# Patient Record
Sex: MALE | Race: WHITE | URBAN - METROPOLITAN AREA
[De-identification: names, ages, dates, MRNs, and addresses within clinical notes are randomized per-mention and may not be internally consistent; named-entity substitution may affect disease eponyms.]

---

## 2017-03-06 PROBLEM — N20.0 KIDNEY STONE ON LEFT SIDE: Status: ACTIVE | Noted: 2017-03-06

## 2019-04-26 ENCOUNTER — HOSPITAL ENCOUNTER (OUTPATIENT)
Age: 39
Setting detail: SPECIMEN
Discharge: HOME | End: 2019-04-26
Payer: COMMERCIAL

## 2019-04-30 LAB
STONE COMPOSITION: NORMAL
STONE DESCRIPTION: NORMAL
STONE MASS: 11 MG
STONE NUMBER: 1
STONE SIZE: NORMAL MM

## 2025-07-21 LAB
BUN / CREAT RATIO: NORMAL
BUN BLDV-MCNC: 20 MG/DL
CREAT SERPL-MCNC: 1.1 MG/DL

## 2025-07-22 ENCOUNTER — TELEPHONE (OUTPATIENT)
Age: 45
End: 2025-07-22

## 2025-07-22 NOTE — TELEPHONE ENCOUNTER
Patient called and had been to the hospital for kidney stones. Pain is now a 4-5 with pain relievers, and 8-9 without. The pain is on the left side by his lower abdomen. No nausea, vomiting, fever, or chills. No blood in the urine.

## 2025-07-25 ENCOUNTER — TELEPHONE (OUTPATIENT)
Age: 45
End: 2025-07-25

## 2025-07-25 RX ORDER — TAMSULOSIN HYDROCHLORIDE 0.4 MG/1
0.4 CAPSULE ORAL DAILY
Qty: 30 CAPSULE | Refills: 0 | Status: SHIPPED | OUTPATIENT
Start: 2025-07-25

## 2025-07-25 NOTE — TELEPHONE ENCOUNTER
MEDICATION: tamsulosin (FLOMAX) 0.4 mg capsule.    PATIENT WENT TO THE ER FOR KIDNEY STONES AND SAYS THE FLOMAX IS HELPING PASS THEM AND WANTS TO KNOW IF HE CAN RECEIVE SOME ADDITIONAL PILLS SINCE HE IS DOWN TO 2 PILLS NOW AND IS FEELING SLIGHTLY BETTER.    PLEASE ADVISE.

## 2025-07-25 NOTE — TELEPHONE ENCOUNTER
See Rx for Tamsulosin 0.4 mg po qd to facilitate passage of the ureteral calculus.  Please see how much pain (0 to 10/10) currently, any fever/chills/nausea or vomiting to determine timing of follow up.

## 2025-08-25 RX ORDER — TAMSULOSIN HYDROCHLORIDE 0.4 MG/1
0.4 CAPSULE ORAL DAILY
Qty: 30 CAPSULE | Refills: 0 | Status: SHIPPED | OUTPATIENT
Start: 2025-08-25

## 2025-09-02 ENCOUNTER — HOSPITAL ENCOUNTER (OUTPATIENT)
Dept: GENERAL RADIOLOGY | Age: 45
Discharge: HOME | End: 2025-09-04
Payer: COMMERCIAL

## 2025-09-02 ENCOUNTER — OFFICE VISIT (OUTPATIENT)
Age: 45
End: 2025-09-02
Payer: COMMERCIAL

## 2025-09-02 ENCOUNTER — HOSPITAL ENCOUNTER (OUTPATIENT)
Age: 45
Setting detail: SPECIMEN
Discharge: HOME | End: 2025-09-02

## 2025-09-02 VITALS
DIASTOLIC BLOOD PRESSURE: 105 MMHG | WEIGHT: 215 LBS | SYSTOLIC BLOOD PRESSURE: 155 MMHG | BODY MASS INDEX: 32.58 KG/M2 | HEART RATE: 67 BPM | HEIGHT: 68 IN

## 2025-09-02 DIAGNOSIS — N20.0 RENAL CALCULUS, BILATERAL: ICD-10-CM

## 2025-09-02 DIAGNOSIS — N20.1 URETERAL CALCULUS, LEFT: ICD-10-CM

## 2025-09-02 DIAGNOSIS — R31.29 MICROHEMATURIA: ICD-10-CM

## 2025-09-02 DIAGNOSIS — N20.1 URETERAL CALCULUS, LEFT: Primary | ICD-10-CM

## 2025-09-02 PROCEDURE — 74018 RADEX ABDOMEN 1 VIEW: CPT

## 2025-09-02 PROCEDURE — 99204 OFFICE O/P NEW MOD 45 MIN: CPT | Performed by: SPECIALIST

## 2025-09-05 LAB
STONE COMPOSITION: NORMAL
STONE DESCRIPTION: NORMAL
STONE MASS: 3 MG